# Patient Record
Sex: MALE | Race: WHITE | Employment: FULL TIME | URBAN - METROPOLITAN AREA
[De-identification: names, ages, dates, MRNs, and addresses within clinical notes are randomized per-mention and may not be internally consistent; named-entity substitution may affect disease eponyms.]

---

## 2018-08-13 ENCOUNTER — HOSPITAL ENCOUNTER (EMERGENCY)
Age: 38
Discharge: HOME | End: 2018-08-13
Payer: MEDICAID

## 2018-08-13 VITALS
DIASTOLIC BLOOD PRESSURE: 90 MMHG | RESPIRATION RATE: 14 BRPM | HEIGHT: 72 IN | SYSTOLIC BLOOD PRESSURE: 144 MMHG | TEMPERATURE: 97.8 F | OXYGEN SATURATION: 95 % | WEIGHT: 220 LBS | BODY MASS INDEX: 29.8 KG/M2 | HEART RATE: 65 BPM

## 2018-08-13 DIAGNOSIS — L25.9 CONTACT DERMATITIS, UNSPECIFIED CONTACT DERMATITIS TYPE, UNSPECIFIED TRIGGER: Primary | ICD-10-CM

## 2018-08-13 PROCEDURE — 99282 EMERGENCY DEPT VISIT SF MDM: CPT

## 2018-08-13 PROCEDURE — 96372 THER/PROPH/DIAG INJ SC/IM: CPT

## 2018-08-13 PROCEDURE — 6360000002 HC RX W HCPCS: Performed by: NURSE PRACTITIONER

## 2018-08-13 RX ORDER — TRIAMCINOLONE ACETONIDE 40 MG/ML
60 INJECTION, SUSPENSION INTRA-ARTICULAR; INTRAMUSCULAR ONCE
Status: COMPLETED | OUTPATIENT
Start: 2018-08-13 | End: 2018-08-13

## 2018-08-13 RX ADMIN — TRIAMCINOLONE ACETONIDE 60 MG: 40 INJECTION, SUSPENSION INTRA-ARTICULAR; INTRAMUSCULAR at 19:03

## 2021-07-03 ENCOUNTER — HOSPITAL ENCOUNTER (EMERGENCY)
Age: 41
Discharge: HOME | End: 2021-07-03
Payer: MEDICAID

## 2021-07-03 VITALS
HEIGHT: 72 IN | OXYGEN SATURATION: 97 % | RESPIRATION RATE: 16 BRPM | DIASTOLIC BLOOD PRESSURE: 97 MMHG | SYSTOLIC BLOOD PRESSURE: 150 MMHG | WEIGHT: 235 LBS | BODY MASS INDEX: 31.83 KG/M2 | TEMPERATURE: 97 F | HEART RATE: 72 BPM

## 2021-07-03 DIAGNOSIS — H61.23 BILATERAL IMPACTED CERUMEN: Primary | ICD-10-CM

## 2021-07-03 PROCEDURE — 69209 REMOVE IMPACTED EAR WAX UNI: CPT

## 2021-07-03 PROCEDURE — 99283 EMERGENCY DEPT VISIT LOW MDM: CPT

## 2021-07-03 RX ORDER — OFLOXACIN 3 MG/ML
5 SOLUTION AURICULAR (OTIC) 2 TIMES DAILY
Qty: 10 ML | Refills: 0 | Status: SHIPPED | OUTPATIENT
Start: 2021-07-03 | End: 2021-07-10

## 2021-07-03 ASSESSMENT — PAIN DESCRIPTION - ORIENTATION: ORIENTATION: LEFT

## 2021-07-03 ASSESSMENT — PAIN SCALES - GENERAL: PAINLEVEL_OUTOF10: 6

## 2021-07-03 ASSESSMENT — PAIN DESCRIPTION - LOCATION: LOCATION: EAR

## 2021-07-03 ASSESSMENT — PAIN DESCRIPTION - PAIN TYPE: TYPE: ACUTE PAIN

## 2021-07-03 NOTE — ED PROVIDER NOTES
[facility]     Department of Emergency Medicine   ED  Provider Note  Admit Date/RoomTime: 7/3/2021 11:38 AM  ED Room: 30/30    Chief Complaint:   Ear Fullness (left ear clogged, onset last night)    History of Present Illness      Pierre Moyer is a 38 y.o. old male who presents to the ED for feeling fullness in his ears. Patient states he tried to flush his ears out at home yesterday and states he got a \"large amount out. \" He is still having discomfort to left ear. He denies any sore throat, headache, dizziness, neck pain, difficulty with breathing, swallowing, or handling his secretions. He denies chest pain, shortness of breath, cough, congestion, or drainage. He does not have a family doctor and does not follow with ENT. He is alert and oriented x3 and in no apparent distress at this exam.  Patient is nontoxic-appearing. ROS   Pertinent positives and negatives are stated within HPI, all other systems reviewed and are negative. Past Medical History:  has no past medical history on file. Past Surgical History:  has no past surgical history on file. Social History:  reports that he has been smoking cigars. He has never used smokeless tobacco. He reports current alcohol use. He reports that he does not use drugs. Family History: family history is not on file. The patients home medications have been reviewed. Allergies: Patient has no known allergies. Allergies have been reviewed with patient. Physical Exam   VS:  BP (!) 150/97   Pulse 72   Temp 97 °F (36.1 °C)   Resp 16   Ht 6' (1.829 m)   Wt 235 lb (106.6 kg)   SpO2 97%   BMI 31.87 kg/m²      Oxygen Saturation Interpretation: Normal.    Constitutional:  Alert, development consistent with age.  NAD  Eyes: EOMI, non-injected conjunctiva   Ears:  External Ears: Bilateral normal.              TM's & External Canals: Right canal with cerumen impaction, Left canal with cerumen impaction   Throat: no erythema or exudates noted., uvula midline, Airway patent     Neck/Lymphatic: Supple. There is no cervical node tenderness. Non-tender with no meningeal signs   Respiratory:  Clear to auscultation and breath sounds equal, good air flow. No respiratory distress  CV: Regular rate and rhythm  Abdomen: Soft, non-tender, non-distended  Integument:  No rashes or erythema present. Neurological:  Motor functions intact. Lab / Imaging Results   (All laboratory and radiology results have been personally reviewed by myself)  Labs:  No results found for this visit on 07/03/21. Imaging: All Radiology results interpreted by Radiologist unless otherwise noted. No orders to display     ED Course / Medical Decision Making   ED Medications:   Medications - No data to display    Consults:  None    Procedures:  Ear irrigation dont by LPN with moderate success  Patient did not telerate procedure very well    Left canal red with improvement of cerumen impaction, mild left canal edema      Medical Decision Making:   Patient is well appearing, non toxic and appropriate for outpatient management. Plan is for symptom management and PCP follow up. Counseling: The emergency provider has spoken with the patient and/or caregiver and discussed todays results, in addition to providing specific details for the plan of care and counseling regarding the diagnosis and prognosis. Questions are answered at this time and they are agreeable with the plan. All results reviewed with pt and all questions answered. I discussed the differential, results and discharge plan with the patient and/or family/friend/caregiver if present. I emphasized the importance of follow-up with the physician I referred them to in the timeframe recommended. I explained reasons for the patient to return to the Emergency Department. Additional verbal discharge instructions were also given and discussed with the patient to supplement those generated by the EMR.  We also discussed medications that were prescribed (if any) including common side effects and interactions. All questions were addressed. They understand return precautions and discharge instructions. The patient and/or family/friend/caregiver expressed understanding. Vitals were stable and they were in no distress at discharge. Patient politely declining Debrox. He will be given ENT follow-up     Assessment     1. Bilateral impacted cerumen      Plan   Discharge to home  Patient condition is good    New Medications     Discharge Medication List as of 7/3/2021 12:28 PM      START taking these medications    Details   ofloxacin (FLOXIN OTIC) 0.3 % otic solution Place 5 drops into the left ear 2 times daily for 7 days TO AFFECTED EAR, Disp-10 mL, R-0Print           Electronically signed by Mohit Ames PA-C   DD: 7/3/21    **This report was transcribed using voice recognition software. Every effort was made to ensure accuracy; however, inadvertent computerized transcription errors may be present.     END OF ED PROVIDER NOTE         Mohit Ames PA-C  07/03/21 [id number]